# Patient Record
Sex: MALE | Race: OTHER | Employment: UNEMPLOYED | URBAN - METROPOLITAN AREA
[De-identification: names, ages, dates, MRNs, and addresses within clinical notes are randomized per-mention and may not be internally consistent; named-entity substitution may affect disease eponyms.]

---

## 2017-06-25 ENCOUNTER — HOSPITAL ENCOUNTER (EMERGENCY)
Age: 27
Discharge: HOME | End: 2017-06-25
Attending: EMERGENCY MEDICINE
Payer: MEDICAID

## 2017-06-25 VITALS
OXYGEN SATURATION: 99 % | SYSTOLIC BLOOD PRESSURE: 118 MMHG | RESPIRATION RATE: 16 BRPM | HEART RATE: 98 BPM | DIASTOLIC BLOOD PRESSURE: 60 MMHG
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
## 2017-06-25 PROCEDURE — 99283 EMERGENCY DEPT VISIT LOW MDM: CPT

## 2017-06-25 RX ORDER — DIPHENHYDRAMINE HCL 25 MG
25 CAPSULE ORAL
Qty: 20 CAP | Refills: 0 | Status: SHIPPED | OUTPATIENT
Start: 2017-06-25 | End: 2017-07-05

## 2017-06-25 NOTE — ED TRIAGE NOTES
Patient disrobed and sprayed with alcohol and changged into paper scrubs. No bed bugs seen and no bug bites seen.

## 2017-06-25 NOTE — ED PROVIDER NOTES
HPI Comments: Patient called 911 ambulance for evaluation of insect bite. They believe that there place is infested with bed bugs. They have had itching mostly of the upper back and arms, the child was not affected. No fever no other complaints. Patient is a 24 y.o. male presenting with Insect Bite. Insect Bite          History reviewed. No pertinent past medical history. History reviewed. No pertinent surgical history. History reviewed. No pertinent family history. Social History     Social History    Marital status:      Spouse name: N/A    Number of children: N/A    Years of education: N/A     Occupational History    Not on file. Social History Main Topics    Smoking status: Not on file    Smokeless tobacco: Not on file    Alcohol use Not on file    Drug use: Not on file    Sexual activity: Not on file     Other Topics Concern    Not on file     Social History Narrative    No narrative on file         ALLERGIES: Review of patient's allergies indicates no known allergies. Review of Systems   Constitutional: Negative for fever. Skin: Positive for rash. Vitals:    06/25/17 0414   BP: 118/60   Pulse: 98   Resp: 16   SpO2: 99%            Physical Exam   Constitutional:   General:  Well-developed, well-nourished,     Head:  Normocephalic atraumatic    Eyes:  Pupils midrange extraocular movements grossly intact. Nose:  No rhinorrhea, inspection grossly normal    Ears:  Grossly normal to inspection    Mouth:  Mucous membranes moist    Neck:  Trachea midline, no asymmetry  Chest:  Grossly normal inspection, symmetric chest rise, respirations nonlabored  Extremities:  Grossly normal to inspection    Skin: Nonspecific findings, no typical insect bite appreciated  Neurologic:  Alert and oriented no appreciable focal neurologic deficit  Psychiatric:  Grossly normal mood and affect  Nursing note reviewed, vital signs reviewed.           MDM  Number of Diagnoses or Management Options  Insect bite, initial encounter:   Diagnosis management comments: ED course:  Patient presenting for evaluation of pruritus and insect bites. Given symptomatic treatment, vital signs are unremarkable. They reported to nursing staff that they did not feel comfortable going home so they're provided shelter information     We'll discharge with Benadryl for symptom treatment     Patient's presentation, history, physical exam and laboratory evaluations were reviewed. At this time patient was felt to be stable for outpatient management and follow with primary care/specialist.  Patient was instructed to return to the emergency department with any concerns. Disposition:     Discharged home        Portions of this chart were created with Dragon medical speech to text program.   Unrecognized errors may be present.       ED Course       Procedures